# Patient Record
Sex: MALE | Race: WHITE | NOT HISPANIC OR LATINO | URBAN - METROPOLITAN AREA
[De-identification: names, ages, dates, MRNs, and addresses within clinical notes are randomized per-mention and may not be internally consistent; named-entity substitution may affect disease eponyms.]

---

## 2017-06-23 ENCOUNTER — IMPORTED ENCOUNTER (OUTPATIENT)
Dept: URBAN - METROPOLITAN AREA CLINIC 50 | Facility: CLINIC | Age: 77
End: 2017-06-23

## 2017-06-27 ENCOUNTER — IMPORTED ENCOUNTER (OUTPATIENT)
Dept: URBAN - METROPOLITAN AREA CLINIC 50 | Facility: CLINIC | Age: 77
End: 2017-06-27

## 2018-02-20 ENCOUNTER — IMPORTED ENCOUNTER (OUTPATIENT)
Dept: URBAN - METROPOLITAN AREA CLINIC 50 | Facility: CLINIC | Age: 78
End: 2018-02-20

## 2018-04-24 ENCOUNTER — IMPORTED ENCOUNTER (OUTPATIENT)
Dept: URBAN - METROPOLITAN AREA CLINIC 50 | Facility: CLINIC | Age: 78
End: 2018-04-24

## 2018-05-02 ENCOUNTER — IMPORTED ENCOUNTER (OUTPATIENT)
Dept: URBAN - METROPOLITAN AREA CLINIC 50 | Facility: CLINIC | Age: 78
End: 2018-05-02

## 2018-05-09 ENCOUNTER — IMPORTED ENCOUNTER (OUTPATIENT)
Dept: URBAN - METROPOLITAN AREA CLINIC 50 | Facility: CLINIC | Age: 78
End: 2018-05-09

## 2018-05-18 ENCOUNTER — IMPORTED ENCOUNTER (OUTPATIENT)
Dept: URBAN - METROPOLITAN AREA CLINIC 50 | Facility: CLINIC | Age: 78
End: 2018-05-18

## 2018-05-24 ENCOUNTER — IMPORTED ENCOUNTER (OUTPATIENT)
Dept: URBAN - METROPOLITAN AREA CLINIC 50 | Facility: CLINIC | Age: 78
End: 2018-05-24

## 2018-05-24 NOTE — PATIENT DISCUSSION
"""S/P IOL OD: Tecnis ZCB00 20.0 (Target: [place]) +Femto/Arcs +TM/Omidria. Continue post operative instructions and drops per schedule.  """

## 2018-05-29 ENCOUNTER — IMPORTED ENCOUNTER (OUTPATIENT)
Dept: URBAN - METROPOLITAN AREA CLINIC 50 | Facility: CLINIC | Age: 78
End: 2018-05-29

## 2018-05-29 NOTE — PATIENT DISCUSSION
"""[place] with IOL OS: 06/14/2018 Federico ZCB00 21.5 Target: [facility] [place] [facility] [place]

## 2018-05-29 NOTE — PATIENT DISCUSSION
"""S/P IOL OD: Tecnis ZCB00 20.0 (Target: [place]) +Femto/Arcs +TM/Omidria. Continue post operative instructions and drops per schedule.  """

## 2018-06-14 ENCOUNTER — IMPORTED ENCOUNTER (OUTPATIENT)
Dept: URBAN - METROPOLITAN AREA CLINIC 50 | Facility: CLINIC | Age: 78
End: 2018-06-14

## 2018-06-14 NOTE — PATIENT DISCUSSION
"""S/P IOL OS: Tecnis ZCB00 21.5 (Target: [place]) +Femto/Arcs +TM/Omidria. Continue post operative instructions and drops per schedule.  """

## 2018-06-19 ENCOUNTER — IMPORTED ENCOUNTER (OUTPATIENT)
Dept: URBAN - METROPOLITAN AREA CLINIC 50 | Facility: CLINIC | Age: 78
End: 2018-06-19

## 2018-06-19 NOTE — PATIENT DISCUSSION
"""S/P IOL OS: Tecnis ZCB00 21.5 (Target: [place]) +Femto/Arcs +TM/Omidria. Continue post operative instructions and drops per schedule.  """

## 2018-07-10 ENCOUNTER — IMPORTED ENCOUNTER (OUTPATIENT)
Dept: URBAN - METROPOLITAN AREA CLINIC 50 | Facility: CLINIC | Age: 78
End: 2018-07-10

## 2018-07-10 NOTE — PATIENT DISCUSSION
"""S/P IOL OU: OD: Tecnis ZCB00 20.0 (Target: [place])Femto/Arcs +TMOmidria. OS: Tecnis ZCB00 21.5 (Target: [place])/Arcs +TM. "

## 2018-08-21 ENCOUNTER — IMPORTED ENCOUNTER (OUTPATIENT)
Dept: URBAN - METROPOLITAN AREA CLINIC 50 | Facility: CLINIC | Age: 78
End: 2018-08-21

## 2019-12-31 ENCOUNTER — IMPORTED ENCOUNTER (OUTPATIENT)
Dept: URBAN - METROPOLITAN AREA CLINIC 50 | Facility: CLINIC | Age: 79
End: 2019-12-31

## 2020-03-10 NOTE — PATIENT DISCUSSION
"""Follow ERM w/o surgery. Call if vision decreases or distortion increases. Recommend regular Amsler checks.  """ Imaging Studies

## 2021-05-14 ASSESSMENT — VISUAL ACUITY
OS_CC: J1+@ 16 IN
OD_BAT: 20/100
OD_CC: J1+
OS_CC: J1+
OS_OTHER: 20/25.
OS_CC: 20/20
OS_BAT: 20/50
OS_CC: 20/60
OD_CC: J1
OS_CC: 20/40+2
OS_CC: J2@ 14 IN
OD_CC: 20/20
OD_BAT: 20/25
OD_OTHER: 20/20.
OS_OTHER: 20/50. 20/50.
OD_BAT: 20/60
OS_BAT: 20/50
OD_SC: 20/25+2
OS_PH: 20/40
OD_OTHER: 20/25. 20/40.
OS_BAT: 20/50
OS_BAT: 20/20
OS_OTHER: 20/40. >20/400.
OS_OTHER: 20/50. 20/50.
OS_OTHER: 20/50. 20/100.
OD_CC: 20/50
OD_SC: 20/40+
OD_BAT: 20/100
OS_SC: 20/20-2
OD_OTHER: 20/100. 20/400.
OD_CC: J1+
OD_SC: 20/20
OD_CC: J2@ 14 IN
OS_CC: 20/20-1
OD_SC: 20/40-1
OS_BAT: 20/25
OS_BAT: 20/40
OS_BAT: 20/60
OS_OTHER: 20/60. >20/400.
OD_BAT: 20/20
OD_SC: 20/20
OD_OTHER: 20/60. 20/200.
OS_CC: 20/25-1
OS_CC: J1
OS_CC: J1+
OD_OTHER: 20/100. 20/400.
OS_SC: 20/20
OD_CC: J1+
OS_OTHER: 20/20. 20/30.
OD_BAT: 20/30
OD_CC: J1+@ 16 IN
OS_SC: 20/40+2
OS_CC: 20/50+1
OD_CC: 20/40
OS_SC: 20/20-1
OD_OTHER: 20/30. 20/100.
OD_CC: 20/40-2
OS_CC: J1+
OD_PH: 20/20-2

## 2021-05-14 ASSESSMENT — TONOMETRY
OD_IOP_MMHG: 32
OS_IOP_MMHG: 10
OS_IOP_MMHG: 15
OS_IOP_MMHG: 17
OS_IOP_MMHG: 18
OD_IOP_MMHG: 12
OS_IOP_MMHG: 18
OD_IOP_MMHG: 16
OS_IOP_MMHG: 11
OD_IOP_MMHG: 18
OS_IOP_MMHG: 14
OS_IOP_MMHG: 28
OS_IOP_MMHG: 13
OD_IOP_MMHG: 18
OD_IOP_MMHG: 12
OD_IOP_MMHG: 17
OS_IOP_MMHG: 11
OD_IOP_MMHG: 15
OD_IOP_MMHG: 10

## 2022-01-31 NOTE — PATIENT DISCUSSION
***2/2/2022:  The patient has reconsidered her goal for surgery. The patient understands that they may need a YAG Capsulotomy within six months of their cataract surgery and may then need an enhancement. The patient understands that it may be a 6 month process to achieve their best vision.

## 2022-01-31 NOTE — PATIENT DISCUSSION
The patient expressed a desire to see through the full range of vision from distance, to middle, to near without glasses. The limitations of advanced lens technology were reviewed and the recommendation was made for the Synergy IOL OU. The patient understands there is no guarantee of glasses free vision and the more complete range of vision from the Synergy lens comes with a trade-off. Side effects include halos around point sources of light at night and failure to adapt in 1 in 500 cases resulting in the need for exchange of the lens. Enhancement, including Lasik, may be necessary to achieve the full uncorrected vision potential. The patient elects Synergy OD, goal of emmetropia. Amilcar***.

## 2022-02-09 NOTE — PATIENT DISCUSSION
Patient advised of the right to post-operative care by the surgeon. Patient is fully informed of, and agreed to, co-management with their primary optometric physician. Post-operative care by the surgeon is not medically necessary and co-management is clinically appropriate. Patient has received itemization of fees related to cataract surgery. Transfer of care letter completed for the patient. Transfer care of right eye to Dr. Stefan Kelley on 2/9/22. Patient instructed to call immediately if any new distortion, blurring, decreased vision or eye pain.

## 2022-02-16 NOTE — PATIENT DISCUSSION
Cataract surgery has been performed in the first eye and activities of daily living are still impaired. The patient would like to proceed with cataract surgery in the second eye as scheduled. The patient elects Synergy IOL OS, goal of emmetropia.  dec for Sx OS made today with KMS pt elects to proceed.

## 2022-02-16 NOTE — PATIENT DISCUSSION
Patient advised of the right to post-operative care by the surgeon. Patient is fully informed of, and agreed to, co-management with their primary optometric physician. Post-operative care by the surgeon is not medically necessary and co-management is clinically appropriate. Patient has received itemization of fees related to cataract surgery. Transfer of care letter completed for the patient. Transfer care of left eye to Dr. Marylee Falconer on 2/16/22. Patient instructed to call immediately if any new distortion, blurring, decreased vision or eye pain.